# Patient Record
Sex: FEMALE | URBAN - METROPOLITAN AREA
[De-identification: names, ages, dates, MRNs, and addresses within clinical notes are randomized per-mention and may not be internally consistent; named-entity substitution may affect disease eponyms.]

---

## 2019-10-21 ENCOUNTER — APPOINTMENT (RX ONLY)
Dept: URBAN - METROPOLITAN AREA CLINIC 151 | Facility: CLINIC | Age: 59
Setting detail: DERMATOLOGY
End: 2019-10-21

## 2019-10-21 DIAGNOSIS — L82.1 OTHER SEBORRHEIC KERATOSIS: ICD-10-CM

## 2019-10-21 DIAGNOSIS — L72.0 EPIDERMAL CYST: ICD-10-CM

## 2019-10-21 DIAGNOSIS — Z71.89 OTHER SPECIFIED COUNSELING: ICD-10-CM

## 2019-10-21 PROCEDURE — ? FULL BODY SKIN EXAM - DECLINED

## 2019-10-21 PROCEDURE — 99202 OFFICE O/P NEW SF 15 MIN: CPT

## 2019-10-21 PROCEDURE — ? OTHER

## 2019-10-21 PROCEDURE — ? COUNSELING

## 2019-10-21 ASSESSMENT — LOCATION ZONE DERM
LOCATION ZONE: FACE
LOCATION ZONE: EYELID

## 2019-10-21 ASSESSMENT — LOCATION DETAILED DESCRIPTION DERM
LOCATION DETAILED: LEFT MEDIAL EYEBROW
LOCATION DETAILED: RIGHT TARSAL MARGIN OF THE INFERIOR EYELID
LOCATION DETAILED: LEFT LATERAL INFERIOR PRETARSAL REGION

## 2019-10-21 ASSESSMENT — LOCATION SIMPLE DESCRIPTION DERM
LOCATION SIMPLE: LEFT LATERAL INFERIOR PRETARSAL REGION
LOCATION SIMPLE: RIGHT LOWER LID TARSAL MARGIN
LOCATION SIMPLE: LEFT EYEBROW

## 2019-10-21 NOTE — PROCEDURE: OTHER
Other (Free Text): Recommended patient schedule consult with ophthalmologist to discuss removal.
Detail Level: Zone
Note Text (......Xxx Chief Complaint.): This diagnosis correlates with the
Other (Free Text): Due to location of lesions, recommended referral to ophthalmologist for removal.  Patient to be referred to Dr. Vora at this time. Patient agrees with plan of care.

## 2023-08-16 ENCOUNTER — EMERGENCY VISIT (OUTPATIENT)
Dept: URBAN - METROPOLITAN AREA CLINIC 25 | Facility: CLINIC | Age: 63
End: 2023-08-16

## 2023-08-16 DIAGNOSIS — H11.122: ICD-10-CM

## 2023-08-16 DIAGNOSIS — H02.833: ICD-10-CM

## 2023-08-16 PROCEDURE — 65210 REMOVE FOREIGN BODY FROM EYE: CPT

## 2023-08-16 PROCEDURE — 99213 OFFICE O/P EST LOW 20 MIN: CPT

## 2023-08-16 ASSESSMENT — VISUAL ACUITY
OD_CC: 20/25
OS_CC: 20/25

## 2023-08-16 ASSESSMENT — KERATOMETRY
OD_AXISANGLE_DEGREES: 153
OD_K2POWER_DIOPTERS: 45.75
OS_K2POWER_DIOPTERS: 45.25
OD_AXISANGLE2_DEGREES: 63
OS_AXISANGLE2_DEGREES: 98
OS_K1POWER_DIOPTERS: 45.00
OD_K1POWER_DIOPTERS: 45.50
OS_AXISANGLE_DEGREES: 8

## 2023-10-05 ENCOUNTER — WEB ENCOUNTER (OUTPATIENT)
Dept: URBAN - METROPOLITAN AREA CLINIC 63 | Facility: CLINIC | Age: 63
End: 2023-10-05

## 2023-10-05 ENCOUNTER — OFFICE VISIT (OUTPATIENT)
Dept: URBAN - METROPOLITAN AREA CLINIC 63 | Facility: CLINIC | Age: 63
End: 2023-10-05
Payer: MEDICARE

## 2023-10-05 VITALS
BODY MASS INDEX: 31.34 KG/M2 | DIASTOLIC BLOOD PRESSURE: 70 MMHG | OXYGEN SATURATION: 98 % | HEIGHT: 66 IN | SYSTOLIC BLOOD PRESSURE: 122 MMHG | HEART RATE: 61 BPM | WEIGHT: 195 LBS | TEMPERATURE: 97.6 F

## 2023-10-05 DIAGNOSIS — K21.9 GASTROESOPHAGEAL REFLUX DISEASE, UNSPECIFIED WHETHER ESOPHAGITIS PRESENT: ICD-10-CM

## 2023-10-05 PROBLEM — 235595009: Status: ACTIVE | Noted: 2023-10-05

## 2023-10-05 PROCEDURE — 99204 OFFICE O/P NEW MOD 45 MIN: CPT | Performed by: INTERNAL MEDICINE

## 2023-10-05 RX ORDER — FAMOTIDINE 40 MG/1
1 TABLET TABLET, FILM COATED ORAL TWICE A DAY
Qty: 60 TABLET | Refills: 1 | OUTPATIENT
Start: 2023-10-05

## 2023-10-05 NOTE — HPI-TODAY'S VISIT:
Rama is a 63-year-old female that presents today as a new patient.  She is having heartburn.  She says that she has had chronic GERD.  However, her symptoms have been worse recently.  She is having heartburn with almost everything that she eats.  Denies dysphagia.  Denies chest pain.  Denies nausea or vomiting.  Denies abdominal pain.  She has been taking Tums has not had much improvement in her symptoms.  She does not like taking any prescription medications.  She reports having EGD and Pulmicort a few years ago.  Records are not available.  Her last colonoscopy was about 6 years ago.  Denies history of colon polyps.  She denies having any other medical problems at this time.  However, she is following with gynecology and is scheduled for D&C.

## 2023-10-13 ENCOUNTER — CONTACT LENSES/GLASSES VISIT (OUTPATIENT)
Dept: URBAN - METROPOLITAN AREA CLINIC 25 | Facility: CLINIC | Age: 63
End: 2023-10-13

## 2023-10-13 DIAGNOSIS — H11.122: ICD-10-CM

## 2023-10-13 PROCEDURE — 99213 OFFICE O/P EST LOW 20 MIN: CPT | Mod: 25

## 2023-10-13 PROCEDURE — 65205 REMOVE FOREIGN BODY FROM EYE: CPT

## 2023-10-13 ASSESSMENT — KERATOMETRY
OD_K2POWER_DIOPTERS: 45.75
OS_K2POWER_DIOPTERS: 45.25
OD_AXISANGLE_DEGREES: 119
OS_AXISANGLE2_DEGREES: 104
OD_K1POWER_DIOPTERS: 45.25
OD_AXISANGLE2_DEGREES: 63
OS_AXISANGLE_DEGREES: 8
OD_K1POWER_DIOPTERS: 45.50
OS_AXISANGLE2_DEGREES: 98
OD_AXISANGLE2_DEGREES: 29
OS_AXISANGLE_DEGREES: 14
OD_AXISANGLE_DEGREES: 153
OD_K2POWER_DIOPTERS: 45.50
OS_K1POWER_DIOPTERS: 45.00

## 2023-10-13 ASSESSMENT — VISUAL ACUITY
OD_CC: 20/25-2
OS_CC: 20/50-2

## 2023-12-14 ENCOUNTER — OFFICE VISIT (OUTPATIENT)
Dept: URBAN - METROPOLITAN AREA CLINIC 63 | Facility: CLINIC | Age: 63
End: 2023-12-14
Payer: MEDICARE

## 2023-12-14 ENCOUNTER — DASHBOARD ENCOUNTERS (OUTPATIENT)
Age: 63
End: 2023-12-14

## 2023-12-14 VITALS
HEART RATE: 68 BPM | BODY MASS INDEX: 31.34 KG/M2 | HEIGHT: 66 IN | WEIGHT: 195 LBS | DIASTOLIC BLOOD PRESSURE: 82 MMHG | OXYGEN SATURATION: 97 % | TEMPERATURE: 97.4 F | SYSTOLIC BLOOD PRESSURE: 126 MMHG

## 2023-12-14 DIAGNOSIS — K21.9 GASTROESOPHAGEAL REFLUX DISEASE, UNSPECIFIED WHETHER ESOPHAGITIS PRESENT: ICD-10-CM

## 2023-12-14 PROCEDURE — 99214 OFFICE O/P EST MOD 30 MIN: CPT | Performed by: INTERNAL MEDICINE

## 2023-12-14 RX ORDER — FAMOTIDINE 40 MG/1
1 TABLET TABLET, FILM COATED ORAL TWICE A DAY
Qty: 60 TABLET | Refills: 1 | Status: ACTIVE | COMMUNITY
Start: 2023-10-05

## 2023-12-14 RX ORDER — FAMOTIDINE 40 MG/1
1 TABLET TABLET, FILM COATED ORAL TWICE A DAY
Qty: 60 TABLET | Refills: 3 | OUTPATIENT
Start: 2023-12-14

## 2023-12-14 NOTE — HPI-TODAY'S VISIT:
Rama is a 63-year-old female that presents today for follow up.  At her visit 10/2023 she was complaining of worsening heartburn symptoms. She was started on Famotidine 40 mg twice daily. Her symptoms improved with this. However, she stopped taking this a few days ago. She had had recurrence of her symptoms off of the Famotidine. Denies dysphagia.  Denies chest pain.  Denies nausea or vomiting.  Denies abdominal pain.  She has been taking Tums has not had much improvement in her symptoms.  She does not like taking any prescription medications.  She reports having EGD few years ago.  Records are not available.  Her last colonoscopy was about 6 years ago.  Denies history of colon polyps.

## 2024-03-11 ENCOUNTER — ESTABLISHED PATIENT (OUTPATIENT)
Dept: URBAN - METROPOLITAN AREA CLINIC 25 | Facility: CLINIC | Age: 64
End: 2024-03-11

## 2024-03-11 DIAGNOSIS — H52.4: ICD-10-CM

## 2024-03-11 DIAGNOSIS — D23.112: ICD-10-CM

## 2024-03-11 DIAGNOSIS — H02.536: ICD-10-CM

## 2024-03-11 PROCEDURE — 92014 COMPRE OPH EXAM EST PT 1/>: CPT

## 2024-03-11 PROCEDURE — 92015 DETERMINE REFRACTIVE STATE: CPT

## 2024-03-11 ASSESSMENT — KERATOMETRY
OS_AXISANGLE_DEGREES: 8
OD_AXISANGLE2_DEGREES: 63
OS_K2POWER_DIOPTERS: 45.50
OS_AXISANGLE2_DEGREES: 107
OS_K1POWER_DIOPTERS: 45.00
OD_AXISANGLE2_DEGREES: 29
OD_AXISANGLE_DEGREES: 119
OS_AXISANGLE2_DEGREES: 104
OD_AXISANGLE_DEGREES: 116
OD_K1POWER_DIOPTERS: 45.50
OS_AXISANGLE_DEGREES: 17
OD_K2POWER_DIOPTERS: 45.75
OD_AXISANGLE2_DEGREES: 26
OS_K2POWER_DIOPTERS: 45.25
OD_K2POWER_DIOPTERS: 45.50
OS_AXISANGLE_DEGREES: 14
OS_AXISANGLE2_DEGREES: 98
OD_K1POWER_DIOPTERS: 45.25
OD_AXISANGLE_DEGREES: 153

## 2024-03-11 ASSESSMENT — TONOMETRY
OS_IOP_MMHG: 10
OD_IOP_MMHG: 10

## 2024-03-11 ASSESSMENT — VISUAL ACUITY
OD_CC: 20/20
OS_CC: 20/20

## 2025-07-09 ENCOUNTER — COMPREHENSIVE EXAM (OUTPATIENT)
Age: 65
End: 2025-07-09

## 2025-07-09 DIAGNOSIS — H52.4: ICD-10-CM

## 2025-07-09 DIAGNOSIS — H02.536: ICD-10-CM

## 2025-07-09 PROCEDURE — 92014 COMPRE OPH EXAM EST PT 1/>: CPT

## 2025-07-09 PROCEDURE — 92015 DETERMINE REFRACTIVE STATE: CPT

## 2025-07-15 ENCOUNTER — APPOINTMENT (OUTPATIENT)
Dept: URBAN - METROPOLITAN AREA CLINIC 328 | Facility: CLINIC | Age: 65
Setting detail: DERMATOLOGY
End: 2025-07-15

## 2025-07-15 DIAGNOSIS — L91.8 OTHER HYPERTROPHIC DISORDERS OF THE SKIN: ICD-10-CM | Status: INADEQUATELY CONTROLLED

## 2025-07-15 DIAGNOSIS — L65.0 TELOGEN EFFLUVIUM: ICD-10-CM | Status: INADEQUATELY CONTROLLED

## 2025-07-15 PROBLEM — D48.5 NEOPLASM OF UNCERTAIN BEHAVIOR OF SKIN: Status: ACTIVE | Noted: 2025-07-15

## 2025-07-15 PROCEDURE — ? FULL BODY SKIN EXAM - DECLINED

## 2025-07-15 PROCEDURE — ? ADDITIONAL NOTES

## 2025-07-15 PROCEDURE — ? BIOPSY BY SHAVE METHOD

## 2025-07-15 PROCEDURE — ? ORDER TESTS

## 2025-07-15 PROCEDURE — ? PRESCRIPTION

## 2025-07-15 PROCEDURE — ? PRESCRIPTION MEDICATION MANAGEMENT

## 2025-07-15 PROCEDURE — ? COUNSELING

## 2025-07-15 RX ORDER — MINOXIDIL 2.5 MG/1
TABLET ORAL
Qty: 30 | Refills: 4 | COMMUNITY
Start: 2025-07-15

## 2025-07-15 RX ORDER — MINOXIDIL/FLUOCIN/TRETINOIN 5 %-0.01 %
SOLUTION, NON-ORAL TOPICAL
Qty: 60 | Refills: 3 | Status: ERX | COMMUNITY
Start: 2025-07-15

## 2025-07-15 RX ADMIN — Medication: at 00:00

## 2025-07-15 RX ADMIN — MINOXIDIL: 2.5 TABLET ORAL at 00:00

## 2025-07-15 ASSESSMENT — LOCATION ZONE DERM
LOCATION ZONE: LEG
LOCATION ZONE: SCALP

## 2025-07-15 ASSESSMENT — LOCATION DETAILED DESCRIPTION DERM
LOCATION DETAILED: LEFT MEDIAL FRONTAL SCALP
LOCATION DETAILED: LEFT ANTERIOR PROXIMAL THIGH

## 2025-07-15 ASSESSMENT — LOCATION SIMPLE DESCRIPTION DERM
LOCATION SIMPLE: LEFT THIGH
LOCATION SIMPLE: LEFT SCALP

## 2025-07-15 NOTE — PROCEDURE: BIOPSY BY SHAVE METHOD
Detail Level: Detailed
Depth Of Biopsy: dermis
Was A Bandage Applied: Yes
Size Of Lesion In Cm: 0.6
X Size Of Lesion In Cm: 0
Biopsy Type: H and E
Biopsy Method: 15 blade
Anesthesia Type: 1% lidocaine with epinephrine
Anesthesia Volume In Cc: 0.5
Hemostasis: Drysol
Wound Care: Gelfoam
Dressing: bandage
Destruction After The Procedure: No
Type Of Destruction Used: Curettage
Curettage Text: The wound bed was treated with curettage after the biopsy was performed.
Cryotherapy Text: The wound bed was treated with cryotherapy after the biopsy was performed.
Electrodesiccation Text: The wound bed was treated with electrodesiccation after the biopsy was performed.
Electrodesiccation And Curettage Text: The wound bed was treated with electrodesiccation and curettage after the biopsy was performed.
Silver Nitrate Text: The wound bed was treated with silver nitrate after the biopsy was performed.
Lab: [id number]
Lab Facility: [id number]
Medical Necessity Information: It is in your best interest to select a reason for this procedure from the list below. All of these items fulfill various CMS LCD requirements except the new and changing color options.
Consent was obtained and risks were reviewed including but not limited to scarring, infection, bleeding, scabbing, incomplete removal, nerve damage and allergy to anesthesia.
Post-Care Instructions: I reviewed with the patient in detail post-care instructions. Patient is to keep the biopsy site dry overnight, and then apply bacitracin twice daily until healed. Patient may apply hydrogen peroxide soaks to remove any crusting.
Notification Instructions: Patient will be notified of biopsy results. However, patient instructed to call the office if not contacted within 2 weeks.
Billing Type: Third-Party Bill
Information: Selecting Yes will display possible errors in your note based on the variables you have selected. This validation is only offered as a suggestion for you. PLEASE NOTE THAT THE VALIDATION TEXT WILL BE REMOVED WHEN YOU FINALIZE YOUR NOTE. IF YOU WANT TO FAX A PRELIMINARY NOTE YOU WILL NEED TO TOGGLE THIS TO 'NO' IF YOU DO NOT WANT IT IN YOUR FAXED NOTE.

## 2025-07-15 NOTE — PROCEDURE: ADDITIONAL NOTES
Detail Level: Simple
Render Risk Assessment In Note?: no
Additional Notes: Patient declined ILK at this time.

## 2025-07-15 NOTE — PROCEDURE: ORDER TESTS
Billing Type: Third-Party Bill
Performing Laboratory: 0
Expected Date Of Service: 07/15/2025
Bill For Surgical Tray: no

## 2025-07-15 NOTE — PROCEDURE: PRESCRIPTION MEDICATION MANAGEMENT
Detail Level: Zone
Initiate Treatment: minoxidil 2.5 mg tablet \\nQuantity: 30.0 Tablet  Days Supply: 30\\nSig: Take 1 tablet daily.\\n\\n[place] 5 %-0.01 %-0.025 % topical solution \\nQuantity: 60.0 ml  Days Supply: 30\\nSig: Apply a few drops to the scalp twice a day on Monday, Wednesday and Friday
Render In Strict Bullet Format?: No